# Patient Record
Sex: FEMALE | Race: WHITE | URBAN - METROPOLITAN AREA
[De-identification: names, ages, dates, MRNs, and addresses within clinical notes are randomized per-mention and may not be internally consistent; named-entity substitution may affect disease eponyms.]

---

## 2022-11-10 ENCOUNTER — OFFICE VISIT (OUTPATIENT)
Dept: URBAN - METROPOLITAN AREA CLINIC 46 | Facility: CLINIC | Age: 72
End: 2022-11-10
Payer: MEDICARE

## 2022-11-10 DIAGNOSIS — H25.13 AGE-RELATED NUCLEAR CATARACT, BILATERAL: Primary | ICD-10-CM

## 2022-11-10 PROCEDURE — 99204 OFFICE O/P NEW MOD 45 MIN: CPT | Performed by: OPHTHALMOLOGY

## 2022-11-10 PROCEDURE — 92136 OPHTHALMIC BIOMETRY: CPT | Performed by: OPHTHALMOLOGY

## 2022-11-10 ASSESSMENT — INTRAOCULAR PRESSURE
OD: 20
OS: 18

## 2022-11-10 NOTE — IMPRESSION/PLAN
Impression: Age-related nuclear cataract, bilateral: H25.13. Plan: Patient advised there is a cataract, and it is affecting their visual functioning. They may proceed with surgery. They were also advised that having cataract surgery does not mean they will not need to use glasses or contact lenses. Reviewed cataract surgery options with patient. Patient is/is not a candidate for LensX, ORA, or upgraded lens. 

[Cat sx OU, OS 1st then OD standard lens, Aim -2.50; tx w/ dextenza if INS allows if not will proceed with pre and post-op surgical drops]

## 2022-12-20 ENCOUNTER — PROCEDURE (OUTPATIENT)
Dept: URBAN - METROPOLITAN AREA SURGERY 25 | Facility: SURGERY | Age: 72
End: 2022-12-20
Payer: MEDICARE

## 2022-12-20 DIAGNOSIS — H25.13 AGE-RELATED NUCLEAR CATARACT, BILATERAL: Primary | ICD-10-CM

## 2022-12-20 PROCEDURE — 66984 XCAPSL CTRC RMVL W/O ECP: CPT | Performed by: OPHTHALMOLOGY

## 2022-12-21 ENCOUNTER — POST-OPERATIVE VISIT (OUTPATIENT)
Dept: URBAN - METROPOLITAN AREA CLINIC 50 | Facility: CLINIC | Age: 72
End: 2022-12-21
Payer: MEDICARE

## 2022-12-21 DIAGNOSIS — Z48.810 ENCOUNTER FOR SURGICAL AFTERCARE FOLLOWING SURGERY ON A SENSE ORGAN: Primary | ICD-10-CM

## 2022-12-21 PROCEDURE — 99024 POSTOP FOLLOW-UP VISIT: CPT | Performed by: OPHTHALMOLOGY

## 2022-12-21 ASSESSMENT — INTRAOCULAR PRESSURE: OS: 20

## 2022-12-21 NOTE — IMPRESSION/PLAN
Impression:  Encounter for surgical aftercare following surgery on a sense organ  Z48.810. Excellent post op course   Post operative instructions reviewed - Condition is improving - Plan: Doing well, continue to observe, call if any new problems.

## 2022-12-28 ENCOUNTER — OFFICE VISIT (OUTPATIENT)
Dept: URBAN - METROPOLITAN AREA CLINIC 50 | Facility: CLINIC | Age: 72
End: 2022-12-28
Payer: MEDICARE

## 2022-12-28 DIAGNOSIS — Z96.1 PRESENCE OF INTRAOCULAR LENS: ICD-10-CM

## 2022-12-28 DIAGNOSIS — H25.11 AGE-RELATED NUCLEAR CATARACT, RIGHT EYE: Primary | ICD-10-CM

## 2022-12-28 PROCEDURE — 99213 OFFICE O/P EST LOW 20 MIN: CPT | Performed by: OPHTHALMOLOGY

## 2022-12-28 ASSESSMENT — INTRAOCULAR PRESSURE
OS: 20
OD: 20

## 2022-12-28 NOTE — IMPRESSION/PLAN
Impression: Age-related nuclear cataract, right eye: H25.11. Plan: Patient advised there is a cataract, and it is affecting their visual functioning. They may proceed with surgery. They were also advised that having cataract surgery does not mean they will not need to use glasses or contact lenses. Reviewed cataract surgery options with patient. Patient is a candidate for LensX, ORA, or upgraded lens. 

[Cat sx OD standard lens, Aim -2.50; tx w/ dextenza if INS allows if not will proceed with pre and post-op surgical drops]

## 2023-01-03 ENCOUNTER — PROCEDURE (OUTPATIENT)
Dept: URBAN - METROPOLITAN AREA SURGERY 25 | Facility: SURGERY | Age: 73
End: 2023-01-03
Payer: MEDICARE

## 2023-01-03 DIAGNOSIS — H25.11 AGE-RELATED NUCLEAR CATARACT, RIGHT EYE: Primary | ICD-10-CM

## 2023-01-03 PROCEDURE — 66984 XCAPSL CTRC RMVL W/O ECP: CPT | Performed by: OPHTHALMOLOGY

## 2023-01-04 ENCOUNTER — POST-OPERATIVE VISIT (OUTPATIENT)
Dept: URBAN - METROPOLITAN AREA CLINIC 50 | Facility: CLINIC | Age: 73
End: 2023-01-04
Payer: MEDICARE

## 2023-01-04 DIAGNOSIS — Z96.1 PRESENCE OF INTRAOCULAR LENS: Primary | ICD-10-CM

## 2023-01-04 PROCEDURE — 99024 POSTOP FOLLOW-UP VISIT: CPT | Performed by: OPHTHALMOLOGY

## 2023-01-04 ASSESSMENT — INTRAOCULAR PRESSURE: OD: 10

## 2023-01-04 NOTE — IMPRESSION/PLAN
Impression: S/P CE/Standard IOL OD - 1 Day. Presence of intraocular lens  Z96.1. Excellent post op course   Post operative instructions reviewed - Condition is improving - Plan: Good post op result. Return in one month for final check of surgery and refraction if desired.

## 2023-02-02 ENCOUNTER — POST-OPERATIVE VISIT (OUTPATIENT)
Dept: URBAN - METROPOLITAN AREA CLINIC 50 | Facility: CLINIC | Age: 73
End: 2023-02-02
Payer: MEDICARE

## 2023-02-02 DIAGNOSIS — H52.223 REGULAR ASTIGMATISM, BILATERAL: Primary | ICD-10-CM

## 2023-02-02 DIAGNOSIS — Z96.1 PRESENCE OF INTRAOCULAR LENS: ICD-10-CM

## 2023-02-02 PROCEDURE — 99024 POSTOP FOLLOW-UP VISIT: CPT | Performed by: OPTOMETRIST

## 2023-02-02 ASSESSMENT — INTRAOCULAR PRESSURE
OD: 17
OS: 16

## 2023-02-02 ASSESSMENT — VISUAL ACUITY
OD: 20/30
OS: 20/25

## 2023-02-02 NOTE — IMPRESSION/PLAN
Impression: S/P Cataract Extraction by phacoemulsification with IOL placement; [id number]: Insert of drug-eluting implant including punct dil OD - 30 Days. Presence of intraocular lens  Z96.1. Excellent post op course. Post operative instructions reviewed - Condition is improving. Plan: Lens Clear & positioned well. Call if any sudden changes occur. --Advised patient to use artificial tears for comfort as needed.